# Patient Record
Sex: FEMALE | Race: WHITE | URBAN - METROPOLITAN AREA
[De-identification: names, ages, dates, MRNs, and addresses within clinical notes are randomized per-mention and may not be internally consistent; named-entity substitution may affect disease eponyms.]

---

## 2023-04-18 LAB
ABO, EXTERNAL RESULT: NORMAL
RH FACTOR, EXTERNAL RESULT: POSITIVE

## 2023-10-10 ENCOUNTER — ROUTINE PRENATAL (OUTPATIENT)
Age: 33
End: 2023-10-10
Payer: COMMERCIAL

## 2023-10-10 VITALS — HEIGHT: 69 IN

## 2023-10-10 PROCEDURE — 76811 OB US DETAILED SNGL FETUS: CPT | Performed by: OBSTETRICS & GYNECOLOGY

## 2023-10-10 PROCEDURE — 99203 OFFICE O/P NEW LOW 30 MIN: CPT | Performed by: OBSTETRICS & GYNECOLOGY

## 2023-10-10 RX ORDER — ONDANSETRON 4 MG/1
TABLET, FILM COATED ORAL
COMMUNITY

## 2023-10-10 RX ORDER — ASPIRIN 81 MG/1
TABLET ORAL
COMMUNITY

## 2023-10-10 NOTE — PROGRESS NOTES
MATERNAL FETAL MEDICINE CONSULTATION    DATE OF CONSULT: 10/10/23    REASON FOR CONSULTATION: IVF        HPI  Lina Carlton is a 27 y.o. female  at 20w0d who is referred for consultation due to an IVF pregnancy. Her prior pregnancy was notable for IVF and a  for failed induction. The source of infertility is unknown. She will be AMA at delivery. She has had preimplantation genetics which were normal.    There is no problem list on file for this patient. History reviewed. No pertinent past medical history. Past Surgical History:   Procedure Laterality Date     SECTION  2022    Failed induction       OB History    Para Term  AB Living   2 1 1     1   SAB IAB Ectopic Molar Multiple Live Births             1      # Outcome Date GA Lbr Baltazar/2nd Weight Sex Delivery Anes PTL Lv   2 Current            1 Term 22 39w6d  3.145 kg (6 lb 14.9 oz) F CS-LTranv EPI N HEATHER      Birth Comments: See delivery note.        Allergies   Allergen Reactions    Penicillins Hives         Current Outpatient Medications:     Prenatal Multivit-Min-Fe-FA (PRENATAL 1 + IRON PO), , Disp: , Rfl:     aspirin (ASPIRIN ADULT LOW DOSE) 81 MG EC tablet, , Disp: , Rfl:     ondansetron (ZOFRAN) 4 MG tablet, , Disp: , Rfl:     Social History     Tobacco Use    Smoking status: Never    Smokeless tobacco: Never   Vaping Use    Vaping Use: Never used   Substance Use Topics    Alcohol use: Not Currently    Drug use: Never       Family History   Problem Relation Age of Onset    Kidney Cancer Father     Diabetes type 2  Maternal Grandfather     Alzheimer's Disease Maternal Grandfather     Breast Cancer Paternal Cousin        Ht 1.753 m (5' 9\")      General: Comfortable, NAD  Constitution: Appears healthy    ULTRASOUND:  Ultrasound Result (most recent):  AMB MATERNAL FETAL MEDICINE ULTRASOUND (VIEWPOINT)     Narrative  PATIENTMelclem Carlton   -  : 1990   -  DOS:10/10/2023   -  INTERPRETING

## 2023-10-10 NOTE — PROCEDURES
PATIENT: Kenan Main   -  : 1990   -  DOS:10/10/2023   -  INTERPRETING PROVIDER:Kiran Vergara,   Indication  ========    IVF, AMA    Method  ======    Transabdominal ultrasound examination. View: Sufficient    Dating  ======    GA by prior assessment 21 w + 0 d  NICOLLE by prior assessment: 2024  Ultrasound examination on: 10/10/2023  GA by U/S based upon: [facility], BPD, Femur, HC  GA by U/S 21 w + 6 d  NICOLLE by U/S: 2024  Assigned: based on stated NICOLLE, selected on 10/10/2023  Assigned GA 21 w + 0 d  Assigned NICOLLE: 2024    Fetal Growth Overview  =================    Exam date        GA              BPD (mm)          HC (mm)              AC (mm)               FL (mm)             HL (mm)            EFW (g)  10/10/2023        21w 0d        52.1     79%        190.8    56%        172.3     80%        38.5    84%        35.3     83%        480    94%    Fetal Biometry  ============    Standard  BPD 52.1 mm 21w 6d 79% Hadlock  OFD 67.1 mm 22w 4d 92% Jose Antonio  .8 mm 21w 2d 56% Hadlock  Cerebellum tr 22.3 mm 20w 6d 58% Hill  Nuchal fold 2.7 mm  .3 mm 22w 1d 80% Hadlock  Femur 38.5 mm 22w 2d 84% Hadlock  Humerus 35.3 mm 22w 1d 83% Jose Antonio   g 22w 0d 94% Hadlock  EFW (lb) 1 lb  EFW (oz) 1 oz  EFW by: Hadlock (BPD-HC-AC-FL)  Extended   6.4 mm  CM 4.9 mm  39% Nicolaides  Nasal bone 8.3 mm  Head / Face / Neck  Nasal bone: present  Other Structures   bpm    General Evaluation  ==============    Cardiac activity present.  bpm. Fetal movements: visualized. Presentation: Cephalic  Placenta: Placental site: anterior, no evidence of low lying  Umbilical cord: Cord vessels: 3 vessel cord.  Insertion site: normal insertion  Amniotic fluid: Amount of AF: normal. MVP 5.9 cm    Fetal Anatomy  ===========    Cranium: normal  Lateral ventricles: normal  Choroid plexus: normal  Midline falx: normal  Cavum septi pellucidi: normal  Cerebellum: normal  Cisterna magna: normal  Head /

## 2024-01-01 ENCOUNTER — HOSPITAL ENCOUNTER (OUTPATIENT)
Facility: HOSPITAL | Age: 34
Discharge: HOME | End: 2024-01-01
Attending: STUDENT IN AN ORGANIZED HEALTH CARE EDUCATION/TRAINING PROGRAM | Admitting: STUDENT IN AN ORGANIZED HEALTH CARE EDUCATION/TRAINING PROGRAM
Payer: COMMERCIAL

## 2024-01-01 VITALS
TEMPERATURE: 97.9 F | SYSTOLIC BLOOD PRESSURE: 103 MMHG | OXYGEN SATURATION: 97 % | HEART RATE: 71 BPM | DIASTOLIC BLOOD PRESSURE: 62 MMHG | RESPIRATION RATE: 15 BRPM

## 2024-01-01 PROBLEM — O09.813 PREGNANCY RESULTING FROM IN VITRO FERTILIZATION IN THIRD TRIMESTER: Status: ACTIVE | Noted: 2021-09-14

## 2024-01-01 PROBLEM — O26.893 RIGHT UPPER QUADRANT ABDOMINAL PAIN AFFECTING PREGNANCY IN THIRD TRIMESTER: Status: ACTIVE | Noted: 2024-01-01

## 2024-01-01 PROBLEM — R10.11 RIGHT UPPER QUADRANT ABDOMINAL PAIN AFFECTING PREGNANCY IN THIRD TRIMESTER: Status: ACTIVE | Noted: 2024-01-01

## 2024-01-01 PROBLEM — Z3A.32 32 WEEKS GESTATION OF PREGNANCY: Status: ACTIVE | Noted: 2024-01-01

## 2024-01-01 LAB
ALBUMIN SERPL-MCNC: 2.3 G/DL (ref 3.5–5)
ALBUMIN/GLOB SERPL: 0.6 (ref 1.1–2.2)
ALP SERPL-CCNC: 139 U/L (ref 45–117)
ALT SERPL-CCNC: 19 U/L (ref 12–78)
ANION GAP SERPL CALC-SCNC: 6 MMOL/L (ref 5–15)
APPEARANCE UR: CLEAR
AST SERPL-CCNC: 16 U/L (ref 15–37)
BACTERIA URNS QL MICRO: ABNORMAL /HPF
BASOPHILS # BLD: 0 K/UL (ref 0–0.1)
BASOPHILS NFR BLD: 1 % (ref 0–1)
BILIRUB SERPL-MCNC: 0.3 MG/DL (ref 0.2–1)
BILIRUB UR QL: NEGATIVE
BUN SERPL-MCNC: 9 MG/DL (ref 6–20)
BUN/CREAT SERPL: 16 (ref 12–20)
CALCIUM SERPL-MCNC: 8.7 MG/DL (ref 8.5–10.1)
CHLORIDE SERPL-SCNC: 107 MMOL/L (ref 97–108)
CO2 SERPL-SCNC: 25 MMOL/L (ref 21–32)
COLOR UR: ABNORMAL
CREAT SERPL-MCNC: 0.56 MG/DL (ref 0.55–1.02)
CREAT UR-MCNC: 136 MG/DL
DIFFERENTIAL METHOD BLD: ABNORMAL
EOSINOPHIL # BLD: 0.1 K/UL (ref 0–0.4)
EOSINOPHIL NFR BLD: 1 % (ref 0–7)
EPITH CASTS URNS QL MICRO: ABNORMAL /LPF
ERYTHROCYTE [DISTWIDTH] IN BLOOD BY AUTOMATED COUNT: 12.4 % (ref 11.5–14.5)
GLOBULIN SER CALC-MCNC: 4.1 G/DL (ref 2–4)
GLUCOSE SERPL-MCNC: 97 MG/DL (ref 65–100)
GLUCOSE UR STRIP.AUTO-MCNC: NEGATIVE MG/DL
HCT VFR BLD AUTO: 32.2 % (ref 35–47)
HGB BLD-MCNC: 11.3 G/DL (ref 11.5–16)
HGB UR QL STRIP: NEGATIVE
IMM GRANULOCYTES # BLD AUTO: 0.1 K/UL (ref 0–0.04)
IMM GRANULOCYTES NFR BLD AUTO: 1 % (ref 0–0.5)
KETONES UR QL STRIP.AUTO: ABNORMAL MG/DL
LDH SERPL L TO P-CCNC: 156 U/L (ref 81–246)
LEUKOCYTE ESTERASE UR QL STRIP.AUTO: ABNORMAL
LYMPHOCYTES # BLD: 3.1 K/UL (ref 0.8–3.5)
LYMPHOCYTES NFR BLD: 35 % (ref 12–49)
MCH RBC QN AUTO: 31.9 PG (ref 26–34)
MCHC RBC AUTO-ENTMCNC: 35.1 G/DL (ref 30–36.5)
MCV RBC AUTO: 91 FL (ref 80–99)
MONOCYTES # BLD: 0.5 K/UL (ref 0–1)
MONOCYTES NFR BLD: 6 % (ref 5–13)
MUCOUS THREADS URNS QL MICRO: ABNORMAL /LPF
NEUTS SEG # BLD: 5 K/UL (ref 1.8–8)
NEUTS SEG NFR BLD: 56 % (ref 32–75)
NITRITE UR QL STRIP.AUTO: NEGATIVE
NRBC # BLD: 0 K/UL (ref 0–0.01)
NRBC BLD-RTO: 0 PER 100 WBC
PH UR STRIP: 7 (ref 5–8)
PLATELET # BLD AUTO: 299 K/UL (ref 150–400)
PMV BLD AUTO: 9.1 FL (ref 8.9–12.9)
POTASSIUM SERPL-SCNC: 4 MMOL/L (ref 3.5–5.1)
PROT SERPL-MCNC: 6.4 G/DL (ref 6.4–8.2)
PROT UR STRIP-MCNC: 30 MG/DL
PROT UR-MCNC: 47 MG/DL (ref 0–11.9)
PROT/CREAT UR-RTO: 0.3
RBC # BLD AUTO: 3.54 M/UL (ref 3.8–5.2)
RBC #/AREA URNS HPF: ABNORMAL /HPF (ref 0–5)
SODIUM SERPL-SCNC: 138 MMOL/L (ref 136–145)
SP GR UR REFRACTOMETRY: 1.02 (ref 1–1.03)
URINE CULTURE IF INDICATED: ABNORMAL
UROBILINOGEN UR QL STRIP.AUTO: 1 EU/DL (ref 0.2–1)
WBC # BLD AUTO: 8.8 K/UL (ref 3.6–11)
WBC URNS QL MICRO: ABNORMAL /HPF (ref 0–4)

## 2024-01-01 PROCEDURE — 81001 URINALYSIS AUTO W/SCOPE: CPT

## 2024-01-01 PROCEDURE — 82570 ASSAY OF URINE CREATININE: CPT

## 2024-01-01 PROCEDURE — 85025 COMPLETE CBC W/AUTO DIFF WBC: CPT

## 2024-01-01 PROCEDURE — 83615 LACTATE (LD) (LDH) ENZYME: CPT

## 2024-01-01 PROCEDURE — 99213 OFFICE O/P EST LOW 20 MIN: CPT

## 2024-01-01 PROCEDURE — 80053 COMPREHEN METABOLIC PANEL: CPT

## 2024-01-01 PROCEDURE — 36415 COLL VENOUS BLD VENIPUNCTURE: CPT

## 2024-01-01 PROCEDURE — 84156 ASSAY OF PROTEIN URINE: CPT

## 2024-01-01 PROCEDURE — 6370000000 HC RX 637 (ALT 250 FOR IP): Performed by: OBSTETRICS & GYNECOLOGY

## 2024-01-01 RX ORDER — ACETAMINOPHEN 325 MG/1
650 TABLET ORAL EVERY 4 HOURS PRN
Status: DISCONTINUED | OUTPATIENT
Start: 2024-01-01 | End: 2024-01-01 | Stop reason: HOSPADM

## 2024-01-01 RX ADMIN — ACETAMINOPHEN 650 MG: 325 TABLET ORAL at 19:10

## 2024-01-01 ASSESSMENT — ENCOUNTER SYMPTOMS
VOMITING: 0
DIARRHEA: 0
RHINORRHEA: 0
CHEST TIGHTNESS: 0
SHORTNESS OF BREATH: 0
TROUBLE SWALLOWING: 0
CONSTIPATION: 0
COUGH: 1
EYE PAIN: 0
BACK PAIN: 0
NAUSEA: 1

## 2024-01-01 ASSESSMENT — PAIN DESCRIPTION - LOCATION: LOCATION: ABDOMEN

## 2024-01-01 ASSESSMENT — PAIN SCALES - GENERAL: PAINLEVEL_OUTOF10: 4

## 2024-01-01 ASSESSMENT — PAIN DESCRIPTION - DESCRIPTORS: DESCRIPTORS: DULL

## 2024-01-01 ASSESSMENT — PAIN DESCRIPTION - ORIENTATION: ORIENTATION: UPPER;RIGHT

## 2024-01-01 ASSESSMENT — PAIN - FUNCTIONAL ASSESSMENT: PAIN_FUNCTIONAL_ASSESSMENT: ACTIVITIES ARE NOT PREVENTED

## 2024-01-01 NOTE — PROGRESS NOTES
1818 Pt arrived to unit for observation for RUQ that started yesterday. Pt states the pain feels dull, but sharp on occasion. Pt stated she has had a cough for a few weeks. Pt denies SOB, HA, N/V, LOF, ctx, bleeding, or visual disturbances. Pt admits to some mucous-like discharge. Pt stated this is an IVF pregnancy. She had a prior LTCS 20 months ago, and plans to have a repeat, unless going into spontaneous labor. Labs obtained, EFM applied.    1839 MD Stanford aware of pt arrival. MD to come to bedside.

## 2024-01-01 NOTE — H&P
L&D Triage    CC: right upper quadrant pain    Subjective:     Patient is a 34 yo  @ 32W6D, yuri 24 by IVF dating, h/o  x 1, pt of Dr Ortega, who presents with complaint of right upper quadrant pain. Patient states pain is chronic- ongoing for a few weeks. Baseline dull ache but became intermittently stabbing in nature since yesterday. Non radiating. Chronic nausea but no emesis. Chronic cough x 1.5 months. +FM. Denies LOF/VB/CTXs. Has no food intolerance. Denies fevers. Does have heartburn at times. Denies HTN.     History reviewed. No pertinent past medical history.    Past Surgical History:   Procedure Laterality Date     SECTION  2022    Failed induction    WISDOM TOOTH EXTRACTION         Family History   Problem Relation Age of Onset    Kidney Cancer Father     Diabetes type 2  Maternal Grandfather     Alzheimer's Disease Maternal Grandfather     Breast Cancer Paternal Cousin         Social History     Socioeconomic History    Marital status:      Spouse name: Not on file    Number of children: 1    Years of education: Not on file    Highest education level: Not on file   Occupational History    Not on file   Tobacco Use    Smoking status: Never    Smokeless tobacco: Never   Vaping Use    Vaping Use: Never used   Substance and Sexual Activity    Alcohol use: Not Currently    Drug use: Never    Sexual activity: Yes     Partners: Male     Comment: denies h/o STIs   Other Topics Concern    Not on file   Social History Narrative    Not on file     Social Determinants of Health     Financial Resource Strain: Not on file   Food Insecurity: Not on file   Transportation Needs: Not on file   Physical Activity: Not on file   Stress: Not on file   Social Connections: Not on file   Intimate Partner Violence: Not on file   Housing Stability: Not on file       Review of Systems  Review of Systems   Constitutional:  Negative for chills and fever.   HENT:  Negative for rhinorrhea and

## 2024-01-02 NOTE — PROGRESS NOTES
1900: SBAR report received from NELSON Coronado.    1907: VORB to remove patient from monitor from MD Stanford. Reactive NST. Repeat serial blood pressures while awaiting lab results.    1950: Patient provided with discharge instructions and education. Patient verbalizing understanding.    2000: Patient ambulatory off unit.

## 2024-01-24 LAB — GBS, EXTERNAL RESULT: POSITIVE

## 2024-02-07 ENCOUNTER — ANESTHESIA (OUTPATIENT)
Facility: HOSPITAL | Age: 34
End: 2024-02-07
Payer: COMMERCIAL

## 2024-02-07 ENCOUNTER — HOSPITAL ENCOUNTER (INPATIENT)
Facility: HOSPITAL | Age: 34
LOS: 2 days | Discharge: HOME | End: 2024-02-09
Attending: STUDENT IN AN ORGANIZED HEALTH CARE EDUCATION/TRAINING PROGRAM | Admitting: STUDENT IN AN ORGANIZED HEALTH CARE EDUCATION/TRAINING PROGRAM
Payer: COMMERCIAL

## 2024-02-07 ENCOUNTER — ANESTHESIA EVENT (OUTPATIENT)
Facility: HOSPITAL | Age: 34
End: 2024-02-07
Payer: COMMERCIAL

## 2024-02-07 DIAGNOSIS — Z98.891 S/P CESAREAN SECTION: Primary | ICD-10-CM

## 2024-02-07 LAB
ERYTHROCYTE [DISTWIDTH] IN BLOOD BY AUTOMATED COUNT: 12.9 % (ref 11.5–14.5)
HCT VFR BLD AUTO: 34.2 % (ref 35–47)
HGB BLD-MCNC: 11.5 G/DL (ref 11.5–16)
HISTORY CHECK: NORMAL
MCH RBC QN AUTO: 29 PG (ref 26–34)
MCHC RBC AUTO-ENTMCNC: 33.6 G/DL (ref 30–36.5)
MCV RBC AUTO: 86.4 FL (ref 80–99)
NRBC # BLD: 0 K/UL (ref 0–0.01)
NRBC BLD-RTO: 0 PER 100 WBC
PLATELET # BLD AUTO: 313 K/UL (ref 150–400)
PMV BLD AUTO: 9.1 FL (ref 8.9–12.9)
RBC # BLD AUTO: 3.96 M/UL (ref 3.8–5.2)
WBC # BLD AUTO: 14.1 K/UL (ref 3.6–11)

## 2024-02-07 PROCEDURE — 1100000000 HC RM PRIVATE

## 2024-02-07 PROCEDURE — 7100000001 HC PACU RECOVERY - ADDTL 15 MIN: Performed by: OBSTETRICS & GYNECOLOGY

## 2024-02-07 PROCEDURE — 1120000000 HC RM PRIVATE OB

## 2024-02-07 PROCEDURE — 51702 INSERT TEMP BLADDER CATH: CPT

## 2024-02-07 PROCEDURE — 86850 RBC ANTIBODY SCREEN: CPT

## 2024-02-07 PROCEDURE — 2500000003 HC RX 250 WO HCPCS: Performed by: NURSE ANESTHETIST, CERTIFIED REGISTERED

## 2024-02-07 PROCEDURE — 86900 BLOOD TYPING SEROLOGIC ABO: CPT

## 2024-02-07 PROCEDURE — 7100000000 HC PACU RECOVERY - FIRST 15 MIN: Performed by: OBSTETRICS & GYNECOLOGY

## 2024-02-07 PROCEDURE — 6360000002 HC RX W HCPCS: Performed by: OBSTETRICS & GYNECOLOGY

## 2024-02-07 PROCEDURE — 2580000003 HC RX 258: Performed by: STUDENT IN AN ORGANIZED HEALTH CARE EDUCATION/TRAINING PROGRAM

## 2024-02-07 PROCEDURE — 6360000002 HC RX W HCPCS: Performed by: NURSE ANESTHETIST, CERTIFIED REGISTERED

## 2024-02-07 PROCEDURE — 2709999900 HC NON-CHARGEABLE SUPPLY: Performed by: OBSTETRICS & GYNECOLOGY

## 2024-02-07 PROCEDURE — 36415 COLL VENOUS BLD VENIPUNCTURE: CPT

## 2024-02-07 PROCEDURE — 3609079900 HC CESAREAN SECTION: Performed by: OBSTETRICS & GYNECOLOGY

## 2024-02-07 PROCEDURE — 86923 COMPATIBILITY TEST ELECTRIC: CPT

## 2024-02-07 PROCEDURE — 85027 COMPLETE CBC AUTOMATED: CPT

## 2024-02-07 PROCEDURE — 3700000025 EPIDURAL BLOCK: Performed by: ANESTHESIOLOGY

## 2024-02-07 PROCEDURE — 86780 TREPONEMA PALLIDUM: CPT

## 2024-02-07 PROCEDURE — 6360000002 HC RX W HCPCS: Performed by: STUDENT IN AN ORGANIZED HEALTH CARE EDUCATION/TRAINING PROGRAM

## 2024-02-07 PROCEDURE — 2580000003 HC RX 258: Performed by: OBSTETRICS & GYNECOLOGY

## 2024-02-07 PROCEDURE — 86901 BLOOD TYPING SEROLOGIC RH(D): CPT

## 2024-02-07 RX ORDER — SODIUM CHLORIDE, SODIUM LACTATE, POTASSIUM CHLORIDE, CALCIUM CHLORIDE 600; 310; 30; 20 MG/100ML; MG/100ML; MG/100ML; MG/100ML
INJECTION, SOLUTION INTRAVENOUS CONTINUOUS
Status: DISCONTINUED | OUTPATIENT
Start: 2024-02-07 | End: 2024-02-08

## 2024-02-07 RX ORDER — SODIUM CHLORIDE, SODIUM LACTATE, POTASSIUM CHLORIDE, AND CALCIUM CHLORIDE .6; .31; .03; .02 G/100ML; G/100ML; G/100ML; G/100ML
1000 INJECTION, SOLUTION INTRAVENOUS PRN
Status: DISCONTINUED | OUTPATIENT
Start: 2024-02-07 | End: 2024-02-08

## 2024-02-07 RX ORDER — SODIUM CHLORIDE, SODIUM LACTATE, POTASSIUM CHLORIDE, AND CALCIUM CHLORIDE .6; .31; .03; .02 G/100ML; G/100ML; G/100ML; G/100ML
500 INJECTION, SOLUTION INTRAVENOUS PRN
Status: DISCONTINUED | OUTPATIENT
Start: 2024-02-07 | End: 2024-02-08

## 2024-02-07 RX ORDER — LIDOCAINE HYDROCHLORIDE AND EPINEPHRINE BITARTRATE 20; .01 MG/ML; MG/ML
INJECTION, SOLUTION SUBCUTANEOUS PRN
Status: DISCONTINUED | OUTPATIENT
Start: 2024-02-07 | End: 2024-02-07 | Stop reason: SDUPTHER

## 2024-02-07 RX ORDER — SODIUM CHLORIDE 9 MG/ML
INJECTION, SOLUTION INTRAVENOUS PRN
Status: DISCONTINUED | OUTPATIENT
Start: 2024-02-07 | End: 2024-02-08

## 2024-02-07 RX ORDER — METHYLERGONOVINE MALEATE 0.2 MG/ML
200 INJECTION INTRAVENOUS PRN
Status: DISCONTINUED | OUTPATIENT
Start: 2024-02-07 | End: 2024-02-08

## 2024-02-07 RX ORDER — SODIUM CHLORIDE 9 MG/ML
25 INJECTION, SOLUTION INTRAVENOUS PRN
Status: DISCONTINUED | OUTPATIENT
Start: 2024-02-07 | End: 2024-02-08

## 2024-02-07 RX ORDER — KETOROLAC TROMETHAMINE 30 MG/ML
INJECTION, SOLUTION INTRAMUSCULAR; INTRAVENOUS PRN
Status: DISCONTINUED | OUTPATIENT
Start: 2024-02-07 | End: 2024-02-07 | Stop reason: SDUPTHER

## 2024-02-07 RX ORDER — METHYLERGONOVINE MALEATE 0.2 MG/ML
INJECTION INTRAVENOUS PRN
Status: DISCONTINUED | OUTPATIENT
Start: 2024-02-07 | End: 2024-02-07 | Stop reason: ALTCHOICE

## 2024-02-07 RX ORDER — FENTANYL 0.2 MG/100ML-BUPIV 0.125%-NACL 0.9% EPIDURAL INJ 2/0.125 MCG/ML-%
12 SOLUTION INJECTION CONTINUOUS
Status: DISCONTINUED | OUTPATIENT
Start: 2024-02-07 | End: 2024-02-08

## 2024-02-07 RX ORDER — DOCUSATE SODIUM 100 MG/1
100 CAPSULE, LIQUID FILLED ORAL 2 TIMES DAILY
Status: DISCONTINUED | OUTPATIENT
Start: 2024-02-07 | End: 2024-02-08

## 2024-02-07 RX ORDER — BUPIVACAINE HYDROCHLORIDE 2.5 MG/ML
INJECTION, SOLUTION EPIDURAL; INFILTRATION; INTRACAUDAL PRN
Status: DISCONTINUED | OUTPATIENT
Start: 2024-02-07 | End: 2024-02-07

## 2024-02-07 RX ORDER — ONDANSETRON 2 MG/ML
INJECTION INTRAMUSCULAR; INTRAVENOUS PRN
Status: DISCONTINUED | OUTPATIENT
Start: 2024-02-07 | End: 2024-02-07 | Stop reason: SDUPTHER

## 2024-02-07 RX ORDER — MISOPROSTOL 200 UG/1
800 TABLET ORAL PRN
Status: DISCONTINUED | OUTPATIENT
Start: 2024-02-07 | End: 2024-02-08

## 2024-02-07 RX ORDER — OXYTOCIN 10 [USP'U]/ML
INJECTION, SOLUTION INTRAMUSCULAR; INTRAVENOUS PRN
Status: DISCONTINUED | OUTPATIENT
Start: 2024-02-07 | End: 2024-02-07 | Stop reason: SDUPTHER

## 2024-02-07 RX ORDER — BUPIVACAINE HYDROCHLORIDE 2.5 MG/ML
INJECTION, SOLUTION EPIDURAL; INFILTRATION; INTRACAUDAL PRN
Status: DISCONTINUED | OUTPATIENT
Start: 2024-02-07 | End: 2024-02-07 | Stop reason: SDUPTHER

## 2024-02-07 RX ORDER — SODIUM CHLORIDE 0.9 % (FLUSH) 0.9 %
5-40 SYRINGE (ML) INJECTION PRN
Status: DISCONTINUED | OUTPATIENT
Start: 2024-02-07 | End: 2024-02-08

## 2024-02-07 RX ORDER — NALOXONE HYDROCHLORIDE 0.4 MG/ML
INJECTION, SOLUTION INTRAMUSCULAR; INTRAVENOUS; SUBCUTANEOUS PRN
Status: DISCONTINUED | OUTPATIENT
Start: 2024-02-07 | End: 2024-02-08

## 2024-02-07 RX ORDER — CLINDAMYCIN PHOSPHATE 900 MG/50ML
900 INJECTION, SOLUTION INTRAVENOUS EVERY 8 HOURS
Status: DISCONTINUED | OUTPATIENT
Start: 2024-02-07 | End: 2024-02-08

## 2024-02-07 RX ORDER — ONDANSETRON 2 MG/ML
4 INJECTION INTRAMUSCULAR; INTRAVENOUS EVERY 6 HOURS PRN
Status: DISCONTINUED | OUTPATIENT
Start: 2024-02-07 | End: 2024-02-08

## 2024-02-07 RX ORDER — TRANEXAMIC ACID 10 MG/ML
1000 INJECTION, SOLUTION INTRAVENOUS
Status: DISCONTINUED | OUTPATIENT
Start: 2024-02-07 | End: 2024-02-08

## 2024-02-07 RX ORDER — SODIUM CHLORIDE 0.9 % (FLUSH) 0.9 %
5-40 SYRINGE (ML) INJECTION EVERY 12 HOURS SCHEDULED
Status: DISCONTINUED | OUTPATIENT
Start: 2024-02-07 | End: 2024-02-08

## 2024-02-07 RX ORDER — CARBOPROST TROMETHAMINE 250 UG/ML
250 INJECTION, SOLUTION INTRAMUSCULAR PRN
Status: DISCONTINUED | OUTPATIENT
Start: 2024-02-07 | End: 2024-02-08

## 2024-02-07 RX ORDER — PHENYLEPHRINE HCL IN 0.9% NACL 0.4MG/10ML
SYRINGE (ML) INTRAVENOUS PRN
Status: DISCONTINUED | OUTPATIENT
Start: 2024-02-07 | End: 2024-02-07 | Stop reason: SDUPTHER

## 2024-02-07 RX ORDER — MORPHINE SULFATE 1 MG/ML
INJECTION, SOLUTION EPIDURAL; INTRATHECAL; INTRAVENOUS PRN
Status: DISCONTINUED | OUTPATIENT
Start: 2024-02-07 | End: 2024-02-07 | Stop reason: SDUPTHER

## 2024-02-07 RX ORDER — DEXAMETHASONE SODIUM PHOSPHATE 4 MG/ML
INJECTION, SOLUTION INTRA-ARTICULAR; INTRALESIONAL; INTRAMUSCULAR; INTRAVENOUS; SOFT TISSUE PRN
Status: DISCONTINUED | OUTPATIENT
Start: 2024-02-07 | End: 2024-02-07 | Stop reason: SDUPTHER

## 2024-02-07 RX ADMIN — LIDOCAINE HYDROCHLORIDE,EPINEPHRINE BITARTRATE 10 ML: 20; .01 INJECTION, SOLUTION INFILTRATION; PERINEURAL at 21:22

## 2024-02-07 RX ADMIN — LIDOCAINE HYDROCHLORIDE,EPINEPHRINE BITARTRATE 5 ML: 20; .01 INJECTION, SOLUTION INFILTRATION; PERINEURAL at 21:33

## 2024-02-07 RX ADMIN — Medication 12 ML/HR: at 20:04

## 2024-02-07 RX ADMIN — GENTAMICIN SULFATE 352 MG: 40 INJECTION, SOLUTION INTRAMUSCULAR; INTRAVENOUS at 21:38

## 2024-02-07 RX ADMIN — OXYTOCIN 40 ML: 10 INJECTION, SOLUTION INTRAMUSCULAR; INTRAVENOUS at 21:50

## 2024-02-07 RX ADMIN — SODIUM CHLORIDE, POTASSIUM CHLORIDE, SODIUM LACTATE AND CALCIUM CHLORIDE 1000 ML: 600; 310; 30; 20 INJECTION, SOLUTION INTRAVENOUS at 11:34

## 2024-02-07 RX ADMIN — KETOROLAC TROMETHAMINE 30 MG: 30 INJECTION, SOLUTION INTRAMUSCULAR; INTRAVENOUS at 22:36

## 2024-02-07 RX ADMIN — BUPIVACAINE HYDROCHLORIDE 10 ML: 2.5 INJECTION, SOLUTION EPIDURAL; INFILTRATION; INTRACAUDAL; PERINEURAL at 12:45

## 2024-02-07 RX ADMIN — ONDANSETRON 4 MG: 2 INJECTION INTRAMUSCULAR; INTRAVENOUS at 21:52

## 2024-02-07 RX ADMIN — Medication 120 MCG: at 21:36

## 2024-02-07 RX ADMIN — CLINDAMYCIN PHOSPHATE 900 MG: 900 INJECTION, SOLUTION INTRAVENOUS at 20:51

## 2024-02-07 RX ADMIN — SODIUM CHLORIDE, POTASSIUM CHLORIDE, SODIUM LACTATE AND CALCIUM CHLORIDE 1000 ML: 600; 310; 30; 20 INJECTION, SOLUTION INTRAVENOUS at 20:56

## 2024-02-07 RX ADMIN — MORPHINE SULFATE 3 MG: 1 INJECTION, SOLUTION EPIDURAL; INTRATHECAL; INTRAVENOUS at 21:57

## 2024-02-07 RX ADMIN — Medication 120 MCG: at 21:44

## 2024-02-07 RX ADMIN — SODIUM CHLORIDE, POTASSIUM CHLORIDE, SODIUM LACTATE AND CALCIUM CHLORIDE: 600; 310; 30; 20 INJECTION, SOLUTION INTRAVENOUS at 13:59

## 2024-02-07 RX ADMIN — Medication 12 ML/HR: at 13:24

## 2024-02-07 RX ADMIN — SODIUM CHLORIDE, POTASSIUM CHLORIDE, SODIUM LACTATE AND CALCIUM CHLORIDE: 600; 310; 30; 20 INJECTION, SOLUTION INTRAVENOUS at 12:34

## 2024-02-07 RX ADMIN — Medication 120 MCG: at 21:39

## 2024-02-07 RX ADMIN — SODIUM CHLORIDE, POTASSIUM CHLORIDE, SODIUM LACTATE AND CALCIUM CHLORIDE: 600; 310; 30; 20 INJECTION, SOLUTION INTRAVENOUS at 21:24

## 2024-02-07 RX ADMIN — LIDOCAINE HYDROCHLORIDE,EPINEPHRINE BITARTRATE 3 ML: 20; .01 INJECTION, SOLUTION INFILTRATION; PERINEURAL at 12:39

## 2024-02-07 RX ADMIN — CLINDAMYCIN PHOSPHATE 900 MG: 900 INJECTION, SOLUTION INTRAVENOUS at 12:20

## 2024-02-07 RX ADMIN — DEXAMETHASONE SODIUM PHOSPHATE 4 MG: 4 INJECTION INTRA-ARTICULAR; INTRALESIONAL; INTRAMUSCULAR; INTRAVENOUS; SOFT TISSUE at 21:52

## 2024-02-07 NOTE — ANESTHESIA PROCEDURE NOTES
Epidural Block    Patient location during procedure: OB  Start time: 2/7/2024 12:25 PM  End time: 2/7/2024 12:50 PM  Reason for block: labor epidural  Staffing  Resident/CRNA: Juan Talbert APRN - CRNA  Performed by: Juan Talbert APRN - CRNA  Authorized by: Francisco J Lovell DO    Epidural  Patient position: sitting  Prep: ChloraPrep  Patient monitoring: continuous pulse ox and frequent blood pressure checks  Approach: midline  Location: L4-5  Injection technique: GUERRERO saline  Provider prep: mask, sterile gloves and sterile gown  Needle  Needle type: Tuohy   Needle gauge: 17 G  Needle length: 3.5 in  Needle insertion depth: 7 cm  Catheter type: end hole  Catheter size: 20 G  Catheter at skin depth: 13 cm  Test dose: negativeCatheter Secured: tegaderm and tape  Assessment  Sensory level: T6  Hemodynamics: stable  Attempts: 1  Outcomes: patient tolerated procedure well and uncomplicated  Preanesthetic Checklist  Completed: patient identified, IV checked, site marked, risks and benefits discussed, surgical/procedural consents, equipment checked, pre-op evaluation, timeout performed, anesthesia consent given, oxygen available, monitors applied/VS acknowledged, fire risk safety assessment completed and verbalized and blood product R/B/A discussed and consented

## 2024-02-07 NOTE — PROGRESS NOTES
1055: Patient arrived to labor and delivery room 205 with c/o contractions. RN at the bedside to discuss pan of care. Patient stated she desires for epidural.    1225: RN at the bedside with patient for placement of epidural per SARAH Martinez.     1229: Time out for epidural placement.    1239: Test dose at this time. Patient tolerated well.     1415:  at the bedside. SVE per MD. Stated patient is 4/50/-2. AROM moderate bloody amount.     1900: Bedside and Verbal shift change report given to Susie DAMON (oncoming nurse) by Stephan DAMON (offgoing nurse). Report included the following information Nurse Handoff Report, Index, Adult Overview, Surgery Report, Intake/Output, MAR, and Recent Results.

## 2024-02-07 NOTE — ANESTHESIA PRE PROCEDURE
Department of Anesthesiology  Preprocedure Note       Name:  Riri Whitlock   Age:  33 y.o.  :  1990                                          MRN:  [id number]         Date:  2024      Surgeon: * No surgeons listed *    Procedure: * No procedures listed *    Medications prior to admission:   Prior to Admission medications    Medication Sig Start Date End Date Taking? Authorizing Provider   Prenatal Multivit-Min-Fe-FA (PRENATAL 1 + IRON PO)     Jennifer Taveras MD   aspirin (ASPIRIN ADULT LOW DOSE) 81 MG EC tablet     Jennifer Taveras MD   ondansetron (ZOFRAN) 4 MG tablet     Jennifer Taveras MD       Current medications:    Current Facility-Administered Medications   Medication Dose Route Frequency Provider Last Rate Last Admin   • lactated ringers IV soln infusion   IntraVENous Continuous Lia Ortega MD       • lactated ringers bolus bolus 500 mL  500 mL IntraVENous PRN Lia Ortega MD        Or   • lactated ringers bolus bolus 1,000 mL  1,000 mL IntraVENous PRN Lia Ortega  mL/hr at 24 1134 1,000 mL at 24 1134   • sodium chloride flush 0.9 % injection 5-40 mL  5-40 mL IntraVENous 2 times per day Lia Ortega MD       • sodium chloride flush 0.9 % injection 5-40 mL  5-40 mL IntraVENous PRN Lia Ortega MD       • 0.9 % sodium chloride infusion  25 mL IntraVENous PRN Lia Ortega MD       • methylergonovine (METHERGINE) injection 200 mcg  200 mcg IntraMUSCular PRN Lia Ortega MD       • carboprost (HEMABATE) injection 250 mcg  250 mcg IntraMUSCular PRN Lia Ortega MD       • miSOPROStol (CYTOTEC) tablet 800 mcg  800 mcg Rectal PRN Lia Ortega MD       • tranexamic acid-NaCl IVPB premix 1,000 mg  1,000 mg IntraVENous Once PRN Lia Ortega MD       • oxytocin (PITOCIN) 30 units in 500 mL infusion  87.3 maria [place] carmen-units/min IntraVENous Continuous PRN Lia Ortega MD        And   • oxytocin (PITOCIN) 10 unit bolus from the

## 2024-02-07 NOTE — H&P
History & Physical    Name: Riri Whitlock MRN: [id number]  SSN: [id number]    YOB: 1990  Age: 33 y.o.  Sex: female        Subjective:     Estimated Date of Delivery: 24    Ms. Whitlock is a  at 38w1d presenting to L&D for labor. Denies LOF, VB. +GFM.  Contractions started around 1AM this morning, getting progressively more intense    Pregnancy complicated by:  History of C/S x1 (2022) for failed induction of labor. Plan for TOLAC if spontaneous labor  IVF pregnancy: ldASA, normal fetal echo, EFW at 36wks 94%tile, AC 96%tile  AMA  Elevated 1hr, CBG x2wks normal    OB History          3    Para   1    Term   1            AB   1    Living   1         SAB        IAB        Ectopic        Molar        Multiple        Live Births   1              No past medical history on file.  Past Surgical History:   Procedure Laterality Date     SECTION  2022    Failed induction    WISDOM TOOTH EXTRACTION       Social History     Occupational History    Not on file   Tobacco Use    Smoking status: Never    Smokeless tobacco: Never   Vaping Use    Vaping Use: Never used   Substance and Sexual Activity    Alcohol use: Not Currently    Drug use: Never    Sexual activity: Yes     Partners: Male     Comment: denies h/o STIs     Family History   Problem Relation Age of Onset    Kidney Cancer Father     Diabetes type 2  Maternal Grandfather     Alzheimer's Disease Maternal Grandfather     Breast Cancer Paternal Cousin        Allergies   Allergen Reactions    Penicillins Hives     Prior to Admission medications    Medication Sig Start Date End Date Taking? Authorizing Provider   Prenatal Multivit-Min-Fe-FA (PRENATAL 1 + IRON PO)     Jennifer Taveras MD   aspirin (ASPIRIN ADULT LOW DOSE) 81 MG EC tablet     Jennifer Taveras MD   ondansetron (ZOFRAN) 4 MG tablet     Jennifer Taveras MD        Review of Systems: Pertinent items are noted in HPI.    Objective:      Vitals:  Vitals:    24 1255 24 1258 24 1301 24 1304   BP: 96/60 98/64 106/69 105/69   Pulse: 83 90 90 78   Resp:       Temp:   97.8 °F (36.6 °C)    TempSrc:   Oral    SpO2:  99%     Weight:       Height:            Physical Exam:    EFW 7lb#  Membranes:  Intact  Fetal Heart Rate: Reactive  Baseline: 140 per minute  Variability: moderate  Accelerations: yes  Decelerations: none  Uterine contractions: irregular, every 2-5 minutes    SVE: 450/-2, AROM bloody fluid    Prenatal Labs:   No components found for: \"OBEXTABORH\", \"OBEXTABSCRN\", \"OBEXTRUBELLA\", \"OBEXTGRBS\", \"OBEXTHBSAG\", \"OBEXTHIV\", \"OBEXTRPR\", \"OBEXTGONORR\", \"OBEXTCHLAM\"     Assessment/Plan:     Plan: 33 y.o.  at 38w1d, admit for labor, TOLAC  - Rh pos / RI/GBS POS   - Diet CLD  - Fetal well being: Cat 1   - Anesthesia PCEA in place, functioning well  - Labor plan: AROM for blood fluid at 14:30, continue expectant management  - GBS pos- PCN allergy. Clindamycin sensitive. S/p first dose    - TOLAC consent: We discussed risks and benefits associated with TOLAC. Reviewed approx 1% risk of uterine rupture requiring emergent  delivery with rare potential morbidity or mortality for infant or patient . Also discussed potential for  section in the context of failed TOLAC, suspected uterine rupture, or other indications. Discussed risks of  to include bleeding, need for transfusion, infection, damage to surrounding structures (tubes, ovaries, bladder, bowel, nerves, blood vessels, etc), VTE, anesthesia risks, and need for further surgeries including  hysterectomy in rare cases. Patient also consented for blood product transfusion. Risks/benefits reviewed. Patient expressed understanding of risks and desire to proceed with TOLAC with acceptance of  delivery if indicated. Consents signed and witnessed. All questions answered.    Lia Ortega MD  [facility]        Signed By:

## 2024-02-08 LAB
ALBUMIN SERPL-MCNC: 1.6 G/DL (ref 3.5–5)
ALBUMIN/GLOB SERPL: 0.4 (ref 1.1–2.2)
ALP SERPL-CCNC: 155 U/L (ref 45–117)
ALT SERPL-CCNC: 11 U/L (ref 12–78)
ANION GAP SERPL CALC-SCNC: 4 MMOL/L (ref 5–15)
AST SERPL-CCNC: 15 U/L (ref 15–37)
BASOPHILS # BLD: 0.1 K/UL (ref 0–0.1)
BASOPHILS NFR BLD: 0 % (ref 0–1)
BILIRUB SERPL-MCNC: 0.5 MG/DL (ref 0.2–1)
BUN SERPL-MCNC: 10 MG/DL (ref 6–20)
BUN/CREAT SERPL: 13 (ref 12–20)
CALCIUM SERPL-MCNC: 7.9 MG/DL (ref 8.5–10.1)
CHLORIDE SERPL-SCNC: 108 MMOL/L (ref 97–108)
CO2 SERPL-SCNC: 23 MMOL/L (ref 21–32)
CREAT SERPL-MCNC: 0.8 MG/DL (ref 0.55–1.02)
DIFFERENTIAL METHOD BLD: ABNORMAL
EOSINOPHIL # BLD: 0.1 K/UL (ref 0–0.4)
EOSINOPHIL NFR BLD: 1 % (ref 0–7)
ERYTHROCYTE [DISTWIDTH] IN BLOOD BY AUTOMATED COUNT: 13.1 % (ref 11.5–14.5)
ERYTHROCYTE [DISTWIDTH] IN BLOOD BY AUTOMATED COUNT: 13.4 % (ref 11.5–14.5)
GLOBULIN SER CALC-MCNC: 3.8 G/DL (ref 2–4)
GLUCOSE SERPL-MCNC: 103 MG/DL (ref 65–100)
HCT VFR BLD AUTO: 22.6 % (ref 35–47)
HCT VFR BLD AUTO: 27.7 % (ref 35–47)
HGB BLD-MCNC: 7.5 G/DL (ref 11.5–16)
HGB BLD-MCNC: 9.6 G/DL (ref 11.5–16)
IMM GRANULOCYTES # BLD AUTO: 0.2 K/UL (ref 0–0.04)
IMM GRANULOCYTES NFR BLD AUTO: 2 % (ref 0–0.5)
LYMPHOCYTES # BLD: 3.8 K/UL (ref 0.8–3.5)
LYMPHOCYTES NFR BLD: 25 % (ref 12–49)
MCH RBC QN AUTO: 28.5 PG (ref 26–34)
MCH RBC QN AUTO: 30.1 PG (ref 26–34)
MCHC RBC AUTO-ENTMCNC: 33.2 G/DL (ref 30–36.5)
MCHC RBC AUTO-ENTMCNC: 34.7 G/DL (ref 30–36.5)
MCV RBC AUTO: 85.9 FL (ref 80–99)
MCV RBC AUTO: 86.8 FL (ref 80–99)
MONOCYTES # BLD: 0.9 K/UL (ref 0–1)
MONOCYTES NFR BLD: 6 % (ref 5–13)
NEUTS SEG # BLD: 10.3 K/UL (ref 1.8–8)
NEUTS SEG NFR BLD: 66 % (ref 32–75)
NRBC # BLD: 0 K/UL (ref 0–0.01)
NRBC # BLD: 0 K/UL (ref 0–0.01)
NRBC BLD-RTO: 0 PER 100 WBC
NRBC BLD-RTO: 0 PER 100 WBC
PLATELET # BLD AUTO: 281 K/UL (ref 150–400)
PLATELET # BLD AUTO: 281 K/UL (ref 150–400)
PMV BLD AUTO: 8.9 FL (ref 8.9–12.9)
PMV BLD AUTO: 9.2 FL (ref 8.9–12.9)
POTASSIUM SERPL-SCNC: 4.4 MMOL/L (ref 3.5–5.1)
PROT SERPL-MCNC: 5.4 G/DL (ref 6.4–8.2)
RBC # BLD AUTO: 2.63 M/UL (ref 3.8–5.2)
RBC # BLD AUTO: 3.19 M/UL (ref 3.8–5.2)
SODIUM SERPL-SCNC: 135 MMOL/L (ref 136–145)
T PALLIDUM AB SER QL IA: NON REACTIVE
WBC # BLD AUTO: 15.1 K/UL (ref 3.6–11)
WBC # BLD AUTO: 15.4 K/UL (ref 3.6–11)

## 2024-02-08 PROCEDURE — 94761 N-INVAS EAR/PLS OXIMETRY MLT: CPT

## 2024-02-08 PROCEDURE — 6360000002 HC RX W HCPCS: Performed by: OBSTETRICS & GYNECOLOGY

## 2024-02-08 PROCEDURE — 2580000003 HC RX 258: Performed by: OBSTETRICS & GYNECOLOGY

## 2024-02-08 PROCEDURE — 1120000000 HC RM PRIVATE OB

## 2024-02-08 PROCEDURE — 04QF0ZZ REPAIR LEFT INTERNAL ILIAC ARTERY, OPEN APPROACH: ICD-10-PCS | Performed by: OBSTETRICS & GYNECOLOGY

## 2024-02-08 PROCEDURE — 30233N1 TRANSFUSION OF NONAUTOLOGOUS RED BLOOD CELLS INTO PERIPHERAL VEIN, PERCUTANEOUS APPROACH: ICD-10-PCS | Performed by: STUDENT IN AN ORGANIZED HEALTH CARE EDUCATION/TRAINING PROGRAM

## 2024-02-08 PROCEDURE — 85025 COMPLETE CBC W/AUTO DIFF WBC: CPT

## 2024-02-08 PROCEDURE — 36415 COLL VENOUS BLD VENIPUNCTURE: CPT

## 2024-02-08 PROCEDURE — 10D00Z2 EXTRACTION OF PRODUCTS OF CONCEPTION, EXTRAPERITONEAL, OPEN APPROACH: ICD-10-PCS | Performed by: OBSTETRICS & GYNECOLOGY

## 2024-02-08 PROCEDURE — 85027 COMPLETE CBC AUTOMATED: CPT

## 2024-02-08 PROCEDURE — 36430 TRANSFUSION BLD/BLD COMPNT: CPT

## 2024-02-08 PROCEDURE — 80053 COMPREHEN METABOLIC PANEL: CPT

## 2024-02-08 PROCEDURE — P9016 RBC LEUKOCYTES REDUCED: HCPCS

## 2024-02-08 PROCEDURE — 6370000000 HC RX 637 (ALT 250 FOR IP): Performed by: OBSTETRICS & GYNECOLOGY

## 2024-02-08 RX ORDER — CARBOPROST TROMETHAMINE 250 UG/ML
250 INJECTION, SOLUTION INTRAMUSCULAR PRN
Status: DISCONTINUED | OUTPATIENT
Start: 2024-02-08 | End: 2024-02-09 | Stop reason: HOSPADM

## 2024-02-08 RX ORDER — SODIUM CHLORIDE 9 MG/ML
INJECTION, SOLUTION INTRAVENOUS PRN
Status: DISCONTINUED | OUTPATIENT
Start: 2024-02-08 | End: 2024-02-09 | Stop reason: HOSPADM

## 2024-02-08 RX ORDER — ACETAMINOPHEN 500 MG
1000 TABLET ORAL EVERY 8 HOURS SCHEDULED
Status: DISCONTINUED | OUTPATIENT
Start: 2024-02-08 | End: 2024-02-09 | Stop reason: HOSPADM

## 2024-02-08 RX ORDER — SODIUM CHLORIDE 0.9 % (FLUSH) 0.9 %
5-40 SYRINGE (ML) INJECTION EVERY 12 HOURS SCHEDULED
Status: DISCONTINUED | OUTPATIENT
Start: 2024-02-08 | End: 2024-02-09 | Stop reason: HOSPADM

## 2024-02-08 RX ORDER — SODIUM CHLORIDE, SODIUM LACTATE, POTASSIUM CHLORIDE, AND CALCIUM CHLORIDE .6; .31; .03; .02 G/100ML; G/100ML; G/100ML; G/100ML
1000 INJECTION, SOLUTION INTRAVENOUS ONCE
Status: COMPLETED | OUTPATIENT
Start: 2024-02-08 | End: 2024-02-08

## 2024-02-08 RX ORDER — LANOLIN/MINERAL OIL
LOTION (ML) TOPICAL
Status: DISCONTINUED | OUTPATIENT
Start: 2024-02-08 | End: 2024-02-09 | Stop reason: HOSPADM

## 2024-02-08 RX ORDER — ONDANSETRON 2 MG/ML
4 INJECTION INTRAMUSCULAR; INTRAVENOUS EVERY 6 HOURS PRN
Status: DISCONTINUED | OUTPATIENT
Start: 2024-02-08 | End: 2024-02-09 | Stop reason: HOSPADM

## 2024-02-08 RX ORDER — METHYLERGONOVINE MALEATE 0.2 MG/ML
200 INJECTION INTRAVENOUS PRN
Status: DISCONTINUED | OUTPATIENT
Start: 2024-02-08 | End: 2024-02-09 | Stop reason: HOSPADM

## 2024-02-08 RX ORDER — TRANEXAMIC ACID 10 MG/ML
1000 INJECTION, SOLUTION INTRAVENOUS
Status: ACTIVE | OUTPATIENT
Start: 2024-02-08 | End: 2024-02-09

## 2024-02-08 RX ORDER — OXYCODONE HYDROCHLORIDE 5 MG/1
10 TABLET ORAL EVERY 4 HOURS PRN
Status: DISCONTINUED | OUTPATIENT
Start: 2024-02-08 | End: 2024-02-09 | Stop reason: HOSPADM

## 2024-02-08 RX ORDER — DOCUSATE SODIUM 100 MG/1
100 CAPSULE, LIQUID FILLED ORAL 2 TIMES DAILY
Status: DISCONTINUED | OUTPATIENT
Start: 2024-02-08 | End: 2024-02-09 | Stop reason: HOSPADM

## 2024-02-08 RX ORDER — OXYCODONE HYDROCHLORIDE 5 MG/1
5 TABLET ORAL EVERY 4 HOURS PRN
Status: DISCONTINUED | OUTPATIENT
Start: 2024-02-08 | End: 2024-02-09 | Stop reason: HOSPADM

## 2024-02-08 RX ORDER — ONDANSETRON 4 MG/1
4 TABLET, ORALLY DISINTEGRATING ORAL EVERY 8 HOURS PRN
Status: DISCONTINUED | OUTPATIENT
Start: 2024-02-08 | End: 2024-02-09 | Stop reason: HOSPADM

## 2024-02-08 RX ORDER — KETOROLAC TROMETHAMINE 30 MG/ML
30 INJECTION, SOLUTION INTRAMUSCULAR; INTRAVENOUS EVERY 6 HOURS
Status: DISPENSED | OUTPATIENT
Start: 2024-02-08 | End: 2024-02-09

## 2024-02-08 RX ORDER — SODIUM CHLORIDE 0.9 % (FLUSH) 0.9 %
5-40 SYRINGE (ML) INJECTION PRN
Status: DISCONTINUED | OUTPATIENT
Start: 2024-02-08 | End: 2024-02-09 | Stop reason: HOSPADM

## 2024-02-08 RX ADMIN — ONDANSETRON 4 MG: 2 INJECTION INTRAMUSCULAR; INTRAVENOUS at 03:29

## 2024-02-08 RX ADMIN — OXYCODONE 10 MG: 5 TABLET ORAL at 02:59

## 2024-02-08 RX ADMIN — ACETAMINOPHEN 1000 MG: 500 TABLET ORAL at 12:36

## 2024-02-08 RX ADMIN — ACETAMINOPHEN 1000 MG: 500 TABLET ORAL at 20:48

## 2024-02-08 RX ADMIN — DOCUSATE SODIUM 100 MG: 100 CAPSULE, LIQUID FILLED ORAL at 09:03

## 2024-02-08 RX ADMIN — SODIUM CHLORIDE, PRESERVATIVE FREE 10 ML: 5 INJECTION INTRAVENOUS at 09:09

## 2024-02-08 RX ADMIN — SODIUM CHLORIDE, POTASSIUM CHLORIDE, SODIUM LACTATE AND CALCIUM CHLORIDE 1000 ML: 600; 310; 30; 20 INJECTION, SOLUTION INTRAVENOUS at 06:57

## 2024-02-08 RX ADMIN — KETOROLAC TROMETHAMINE 30 MG: 30 INJECTION, SOLUTION INTRAMUSCULAR at 17:15

## 2024-02-08 RX ADMIN — KETOROLAC TROMETHAMINE 30 MG: 30 INJECTION, SOLUTION INTRAMUSCULAR at 09:03

## 2024-02-08 ASSESSMENT — PAIN SCALES - GENERAL
PAINLEVEL_OUTOF10: 8
PAINLEVEL_OUTOF10: 3

## 2024-02-08 ASSESSMENT — PAIN DESCRIPTION - DESCRIPTORS
DESCRIPTORS: ACHING;SORE
DESCRIPTORS: ACHING;SORE

## 2024-02-08 ASSESSMENT — PAIN DESCRIPTION - LOCATION
LOCATION: ABDOMEN
LOCATION: ABDOMEN

## 2024-02-08 ASSESSMENT — PAIN DESCRIPTION - ORIENTATION
ORIENTATION: ANTERIOR;LOWER
ORIENTATION: ANTERIOR;LOWER

## 2024-02-08 NOTE — OP NOTE
[facility]  OPERATIVE REPORT    Name:  CHACORTA THORNE  MR#:  [id number]  :  1990  ACCOUNT #:  [id number]  DATE OF SERVICE:  2024    PREOPERATIVE DIAGNOSES:  1.  Prior , failed attempt at vaginal birth   2.  Cervix with slow change, only 5 cm.  3.  Bloody amniotic fluid, suspicious for placental abruption and prior .  4.  Pregnancy, 38 weeks.    POSTOPERATIVE DIAGNOSES:  1.  Prior , failed attempt at vaginal birth  2.  Cervix with slow change, only 5 cm.  3.  Bloody amniotic fluid, suspicious for placental abruption and prior .  4.  Pregnancy, 38 weeks.  5.  Live female infant.    PROCEDURE PERFORMED:  Repeat  delivery through a left paravesical extraperitoneal  with a low transverse uterine segment incision.    SURGEON:  Rodney Herron MD    ASSISTANT:  None.    ANESTHESIA:  Epidural.    BIRTH:  4140 g and 9 pound 2 ounce live female infant who had good Apgars and is currently doing well.    COMPLICATIONS:  Laceration of the left uterine artery.    SPECIMENS REMOVED:  Placenta and membranes were sent to Pathology.    DRAIN:  Lowery catheter to straight drainage.  Urine had blood in it, but also had blood in it prior to the surgery.    IMPLANTS:  None.    ESTIMATED BLOOD LOSS:  2200 mL.    PROCEDURE:  We went back to the operating room.  She was placed on the table in supine position.  She was prepped and draped in the usual sterile manner.  Epidural catheter was dosed.    I made a low transverse skin incision there in the previous scar.  I incised subcutaneous layer, anterior fascia sheath, and medial half of the rectus abdominis muscles transversely.    I opened up the space of Retzius and identified the left obliterated umbilical artery and swept the bladder down to the right off the lower uterine segment.    I identified the lower uterine segment, incised it with a scalpel; then, completed the incision with Metzenbaum

## 2024-02-08 NOTE — PROGRESS NOTES
Post-Partum  Delivery Note    Riri Whitlock  [id number]  1990  33 y.o.    S:  Ms. Riri Whitlock is a 33 y.o.  POD #1 s/p rLTCS @ 38w1d for Aodil/c/b PPH of 2200 due to uterine artery laceration.   She had a baby girl.  Her lochia is like a period.  She denies SOB, CP, LE swelling. Denies incision drainage or redness. She describes her pain as mild and is well controlled with PO medications.  She is breast feeding and supplementing with formula feeds.  She is ambulating. Jefferson catheter still in place.  Tolerating PO intake.  Endorses some weakness, no dizziness but has not been ambulating.     O:   /70   Pulse 73   Temp 98.1 °F (36.7 °C) (Oral)   Resp 16   Ht 1.778 m (5' 10\")   Wt 81.6 kg (180 lb)   SpO2 97%   Breastfeeding Unknown   BMI 25.83 kg/m²     Gen - No acute distress  Respirations - nonlabored   Abdomen - Fundus firm below the umbilicus   Ext - Warm, well perfused, nontender     Lab Results   Component Value Date/Time    WBC 15.1 2024 06:23 AM    HGB 7.5 2024 06:23 AM    HCT 22.6 2024 06:23 AM     2024 06:23 AM    MCV 85.9 2024 06:23 AM     I/O last 3 completed shifts:  In: 2756.3 [I.V.:1516.2; IV Piggyback:1240.1]  Out: 3110 [Urine:890; Blood:2220]  No intake/output data recorded.  ~56cc/hr (>0.5cc/kg/hr)     A/P:  33 y.o.  POSTOP D #1 s/p rLTCS for Aodil c/b PPH 2200cc, plan for 1u pRBC for symptomatic anemia/low Hg today.  Currently hemodynamically stable.   1.  Routine PP instructions/ care discussed  2.  Blood type - Rh pos  3. Postop Care      >Post-op concerns: PPH 2200cc, continue with jefferson in place to monitor UOP, discontinue when adequate, 1u pRBC this morning with low threshold if symptomatic when ambulating to give second       >Reviewed wound care instructions and warning signs POD#1      >Incision site healing well, no signs of infection       >Encouraged ambulation when jefferson discontinued      >SCDs while in bed,  moderate risk factors for VTE   4.  Rubella immune  5.  Circumcision N/A   6.  Discharge POD 2-3   7.  F/U 4-6 weeks for PP check.      Blood consent:   The patient was consented for use of blood products (RBCs, platelets, FFP, cryo) in the case of life-threatening hemorrhage and she will  accept use of all products. The risks of acute hemolytic transfusion reaction, fever, TRALI, TACO, and transfusion-associated infection is rare (approximately 1/1,000,000 for HIV, and 1/1-2,000,000 for Hepatis B/C). The patient verbalized her consent, and wished to proceeds. All consent forms were witnessed, signed, and placed in the patient's designated chart.      Marry Lorenzo MD  [facility]

## 2024-02-08 NOTE — BRIEF OP NOTE
OP Note  2024      PreOp Diagnosis: Failed  attempt, Arrest dilation 5 cms, Bloody AF ? Abruption, 38 weeks +GBS    Post OP diagnosis: Same + LFI    Anesthesia: Epidural    Surgeon: KARLA Herron    Assistant: * No surgical staff found *    Procedure: Left paravesical extraperitoneal , Modified Maylard transverse low abdominal incision    Birth:  Baby Weight:   Information for the patient's :  Quique Whitlock Riri [[id number]]   Birth Weight: 4.14 kg (9 lb 2 oz)     Baby Apgar 1 min:   Information for the patient's :  Quique Whitlock Riri [[id number]]   APGAR One: N/A     Baby Apgar 5 min:   Information for the patient's :  WhitlockQuique [[id number]]   APGAR Five: N/A     Baby :   Information for the patient's :  Quique Whitlock Riri [[id number]]   2024     Baby Type of Delivery:   Information for the patient's :  Quique Whitlock [[id number]]   Delivery Method: N/A     Baby Gender:   Information for the patient's :  Quique Whitlock Riri [[id number]]   female     Specimen: Placenta    Drain:  Lowery    QBL: 2200 mls, from laceration left uterine artery    Complications: None    Implants: None    Dictation: [id number]    Rodney Herron MD  2024

## 2024-02-08 NOTE — ANESTHESIA POSTPROCEDURE EVALUATION
Department of Anesthesiology  Postprocedure Note    Patient: Riri Whitlock  MRN: [id number]  YOB: 1990  Date of evaluation: 2/8/2024    Procedure Summary     Date: 02/07/24 Room / Location:     Anesthesia Start: 1215 Anesthesia Stop: 2251    Procedure: Labor Analgesia Diagnosis:     Scheduled Providers:  Responsible Provider: Luc Mistry MD    Anesthesia Type: Regional, Epidural ASA Status: 2          Anesthesia Type: Regional, Epidural    Too Phase I:      Too Phase II:      Anesthesia Post Evaluation    Patient location during evaluation: bedside  Patient participation: complete - patient participated  Level of consciousness: awake  Airway patency: patent  Nausea & Vomiting: no vomiting and no nausea  Cardiovascular status: hemodynamically stable  Respiratory status: acceptable  Hydration status: stable  Pain management: adequate        No notable events documented.

## 2024-02-08 NOTE — PROGRESS NOTES
0650: Dr. Herron informed of pt's low urine output (approx. 15mL/hr). Bolus of 1000mL of LR ordered to run over two hours.

## 2024-02-08 NOTE — PROGRESS NOTES
Labor Progress Note  Patient seen, fetal heart rate and contraction pattern evaluated, patient examined.  Patient is comfortable with epidural.  Vitals:    24   BP:    Pulse:    Resp: 20   Temp: 99.1 °F (37.3 °C)   SpO2: 100%       Physical Exam:    33 y.o.  in no acute distress.    Cervical Exam:   Presentation Vertex  Dilation 5 cms   Effacement 90 %   Station -2  Membranes: prior amniotomy with bloody fluid, still bloody  Uterine Activity:   Frequency: Every 4 minutes   Duration: 60 seconds   Intensity: moderate  Fetal Heart Rate:    Baseline: 155 bpm   Variability: Moderate   Accelerations: Absent   Decelerations: None  Category: Category 1    Recent Results (from the past 12 hour(s))   CBC    Collection Time: 24 11:31 AM   Result Value Ref Range    WBC 14.1 (H) 3.6 - 11.0 K/uL    RBC 3.96 3.80 - 5.20 M/uL    Hemoglobin 11.5 11.5 - 16.0 g/dL    Hematocrit 34.2 (L) 35.0 - 47.0 %    MCV 86.4 80.0 - 99.0 FL    MCH 29.0 26.0 - 34.0 PG    MCHC 33.6 30.0 - 36.5 g/dL    RDW 12.9 11.5 - 14.5 %    Platelets 313 150 - 400 K/uL    MPV 9.1 8.9 - 12.9 FL    Nucleated RBCs 0.0 0  WBC    nRBC 0.00 0.00 - 0.01 K/uL   TYPE AND SCREEN    Collection Time: 24 11:31 AM   Result Value Ref Range    Crossmatch expiration date 02/10/2024,2359     ABO/Rh A POSITIVE     Antibody Screen NEG        Assessment/Plan:     Prior  with bloody fluid.  Fetal heart rate was tachycardia, but better now.  Cervix has little change  We discussed options, and she would like to proceed with a .  I suspect an abruption.  I recommend a  delivery.  I reviewed the benefits and risks, including infection, incsional problems, injury to adjacent organs, bleeding, and medical complications (pneumonia, thrombosis).  I answered her questions.  She agrees.        Rodney Herron MD  2024

## 2024-02-08 NOTE — PROGRESS NOTES
Care assumed at this time, repositioned pt, adwoa care done, adwoa pad changed     2045 Dr Herron in room checking pt, tracing reviewed, SVE done, cervix 5 cm dilated, bleeding ++ with SVE    2055 section called by Dr Herron    2/8 0145 Dr Herron made aware of pt's PP vitals and I/O, order received to transfer pt to PP

## 2024-02-08 NOTE — LACTATION NOTE
This note was copied from a baby's chart.  Mother attempting to feed baby on right side.  Right nipple noted to have an abrasion, baby sleeping.  Assisted mother with waking baby, positioning on left breast, and latching deeply.  Baby latched however no suck achieved.  Hand expression taught as a means to feed baby when baby is too sleepy to nurse.  BF basics reviewed.    Discussed with mother her plan for feeding.  Reviewed the benefits of exclusive breast milk feeding during the hospital stay.   Informed her of the risks of using formula to supplement in the first few days of life as well as the benefits of successful breast milk feeding; referred her to the Breastfeeding booklet about this information.   She acknowledges understanding of information reviewed and states that it is her plan to breastfeed her infant.  Will support her choice and offer additional information as needed.     Reviewed breastfeeding basics:  How milk is made and normal  breastfeeding behaviors discussed.  Supply and demand,  stomach size, early feeding cues, skin to skin bonding with comfortable positioning and baby led latch-on reviewed.  How to identify signs of successful breastfeeding sessions reviewed; education on asymetrical latch, signs of effective latching vs shallow, in-effective latching, normal  feeding frequency and duration and expected infant output discussed.  Normal course of breastfeeding discussed including the AAP's recommendation that children receive exclusive breast milk feedings for the first six months of life with breast milk feedings to continue through the first year of life and/or beyond as complimentary table foods are added.  Breastfeeding Booklet and Warm line information provided with discussion.  Discussed typical  weight loss and the importance of pediatrician appointment within 24-48 hours of discharge, at 2 weeks of life and normalcy of requesting pediatric weight checks

## 2024-02-09 VITALS
RESPIRATION RATE: 16 BRPM | HEIGHT: 70 IN | TEMPERATURE: 97.9 F | WEIGHT: 180 LBS | DIASTOLIC BLOOD PRESSURE: 70 MMHG | SYSTOLIC BLOOD PRESSURE: 108 MMHG | OXYGEN SATURATION: 99 % | BODY MASS INDEX: 25.77 KG/M2 | HEART RATE: 77 BPM

## 2024-02-09 LAB
ABO + RH BLD: NORMAL
BASOPHILS # BLD: 0 K/UL (ref 0–0.1)
BASOPHILS # BLD: 0 K/UL (ref 0–0.1)
BASOPHILS NFR BLD: 0 % (ref 0–1)
BASOPHILS NFR BLD: 0 % (ref 0–1)
BLD PROD TYP BPU: NORMAL
BLOOD BANK DISPENSE STATUS: NORMAL
BLOOD GROUP ANTIBODIES SERPL: NORMAL
BPU ID: NORMAL
CROSSMATCH RESULT: NORMAL
DIFFERENTIAL METHOD BLD: ABNORMAL
DIFFERENTIAL METHOD BLD: ABNORMAL
EOSINOPHIL # BLD: 0 K/UL (ref 0–0.4)
EOSINOPHIL # BLD: 0.1 K/UL (ref 0–0.4)
EOSINOPHIL NFR BLD: 0 % (ref 0–7)
EOSINOPHIL NFR BLD: 1 % (ref 0–7)
ERYTHROCYTE [DISTWIDTH] IN BLOOD BY AUTOMATED COUNT: 13.5 % (ref 11.5–14.5)
ERYTHROCYTE [DISTWIDTH] IN BLOOD BY AUTOMATED COUNT: 13.6 % (ref 11.5–14.5)
HCT VFR BLD AUTO: 22.9 % (ref 35–47)
HCT VFR BLD AUTO: 25.8 % (ref 35–47)
HGB BLD-MCNC: 7.6 G/DL (ref 11.5–16)
HGB BLD-MCNC: 8.5 G/DL (ref 11.5–16)
IMM GRANULOCYTES # BLD AUTO: 0 K/UL
IMM GRANULOCYTES # BLD AUTO: 0.3 K/UL (ref 0–0.04)
IMM GRANULOCYTES NFR BLD AUTO: 0 %
IMM GRANULOCYTES NFR BLD AUTO: 2 % (ref 0–0.5)
LYMPHOCYTES # BLD: 2.8 K/UL (ref 0.8–3.5)
LYMPHOCYTES # BLD: 3.3 K/UL (ref 0.8–3.5)
LYMPHOCYTES NFR BLD: 22 % (ref 12–49)
LYMPHOCYTES NFR BLD: 30 % (ref 12–49)
MCH RBC QN AUTO: 29.2 PG (ref 26–34)
MCH RBC QN AUTO: 29.2 PG (ref 26–34)
MCHC RBC AUTO-ENTMCNC: 32.9 G/DL (ref 30–36.5)
MCHC RBC AUTO-ENTMCNC: 33.2 G/DL (ref 30–36.5)
MCV RBC AUTO: 88.1 FL (ref 80–99)
MCV RBC AUTO: 88.7 FL (ref 80–99)
METAMYELOCYTES NFR BLD MANUAL: 1 %
MONOCYTES # BLD: 0.1 K/UL (ref 0–1)
MONOCYTES # BLD: 0.5 K/UL (ref 0–1)
MONOCYTES NFR BLD: 1 % (ref 5–13)
MONOCYTES NFR BLD: 4 % (ref 5–13)
NEUTS BAND NFR BLD MANUAL: 8 % (ref 0–6)
NEUTS SEG # BLD: 7.5 K/UL (ref 1.8–8)
NEUTS SEG # BLD: 8.9 K/UL (ref 1.8–8)
NEUTS SEG NFR BLD: 60 % (ref 32–75)
NEUTS SEG NFR BLD: 71 % (ref 32–75)
NRBC # BLD: 0 K/UL (ref 0–0.01)
NRBC # BLD: 0.02 K/UL (ref 0–0.01)
NRBC BLD-RTO: 0 PER 100 WBC
NRBC BLD-RTO: 0.2 PER 100 WBC
PLATELET # BLD AUTO: 238 K/UL (ref 150–400)
PLATELET # BLD AUTO: 284 K/UL (ref 150–400)
PMV BLD AUTO: 9 FL (ref 8.9–12.9)
PMV BLD AUTO: 9 FL (ref 8.9–12.9)
RBC # BLD AUTO: 2.6 M/UL (ref 3.8–5.2)
RBC # BLD AUTO: 2.91 M/UL (ref 3.8–5.2)
RBC MORPH BLD: ABNORMAL
RBC MORPH BLD: ABNORMAL
SPECIMEN EXP DATE BLD: NORMAL
UNIT DIVISION: 0
WBC # BLD AUTO: 11 K/UL (ref 3.6–11)
WBC # BLD AUTO: 12.6 K/UL (ref 3.6–11)
WBC MORPH BLD: ABNORMAL
WBC MORPH BLD: ABNORMAL

## 2024-02-09 PROCEDURE — 36415 COLL VENOUS BLD VENIPUNCTURE: CPT

## 2024-02-09 PROCEDURE — 6370000000 HC RX 637 (ALT 250 FOR IP): Performed by: STUDENT IN AN ORGANIZED HEALTH CARE EDUCATION/TRAINING PROGRAM

## 2024-02-09 PROCEDURE — 6370000000 HC RX 637 (ALT 250 FOR IP): Performed by: OBSTETRICS & GYNECOLOGY

## 2024-02-09 PROCEDURE — 85025 COMPLETE CBC W/AUTO DIFF WBC: CPT

## 2024-02-09 PROCEDURE — 94761 N-INVAS EAR/PLS OXIMETRY MLT: CPT

## 2024-02-09 PROCEDURE — 6360000002 HC RX W HCPCS: Performed by: OBSTETRICS & GYNECOLOGY

## 2024-02-09 RX ORDER — IBUPROFEN 800 MG/1
800 TABLET ORAL EVERY 8 HOURS PRN
Status: DISCONTINUED | OUTPATIENT
Start: 2024-02-09 | End: 2024-02-09 | Stop reason: HOSPADM

## 2024-02-09 RX ORDER — ACETAMINOPHEN 500 MG
1000 TABLET ORAL EVERY 8 HOURS SCHEDULED
Qty: 60 TABLET | Refills: 0 | Status: SHIPPED | OUTPATIENT
Start: 2024-02-09

## 2024-02-09 RX ORDER — IBUPROFEN 800 MG/1
800 TABLET ORAL EVERY 8 HOURS
Qty: 60 TABLET | Refills: 0 | Status: SHIPPED | OUTPATIENT
Start: 2024-02-09

## 2024-02-09 RX ORDER — OXYCODONE HYDROCHLORIDE 5 MG/1
5 TABLET ORAL EVERY 4 HOURS PRN
Qty: 12 TABLET | Refills: 0 | Status: SHIPPED | OUTPATIENT
Start: 2024-02-09 | End: 2024-02-12

## 2024-02-09 RX ADMIN — KETOROLAC TROMETHAMINE 30 MG: 30 INJECTION, SOLUTION INTRAMUSCULAR at 00:12

## 2024-02-09 RX ADMIN — OXYCODONE HYDROCHLORIDE 5 MG: 5 TABLET ORAL at 13:44

## 2024-02-09 RX ADMIN — ACETAMINOPHEN 1000 MG: 500 TABLET ORAL at 08:16

## 2024-02-09 RX ADMIN — OXYCODONE 10 MG: 5 TABLET ORAL at 00:12

## 2024-02-09 RX ADMIN — IBUPROFEN 800 MG: 800 TABLET, FILM COATED ORAL at 10:24

## 2024-02-09 RX ADMIN — OXYCODONE HYDROCHLORIDE 5 MG: 5 TABLET ORAL at 04:44

## 2024-02-09 RX ADMIN — ACETAMINOPHEN 1000 MG: 500 TABLET ORAL at 04:44

## 2024-02-09 ASSESSMENT — PAIN DESCRIPTION - DESCRIPTORS
DESCRIPTORS: ACHING;SORE
DESCRIPTORS: ACHING;NAGGING;DISCOMFORT

## 2024-02-09 ASSESSMENT — PAIN DESCRIPTION - ORIENTATION
ORIENTATION: ANTERIOR;LOWER
ORIENTATION: ANTERIOR;LOWER

## 2024-02-09 ASSESSMENT — PAIN SCALES - GENERAL
PAINLEVEL_OUTOF10: 7
PAINLEVEL_OUTOF10: 5

## 2024-02-09 ASSESSMENT — PAIN DESCRIPTION - LOCATION
LOCATION: ABDOMEN
LOCATION: ABDOMEN

## 2024-02-09 NOTE — DISCHARGE INSTRUCTIONS
Discharge Instructions for  Section    Patient ID:  Riri Whitlock  [id number]  33 y.o.  1990    Continue taking your prenatal vitamins if you are breastfeeding.    Follow-up care is a key part of your treatment and safety. Be sure to keep all your scheduled appointments    Activity  Avoid heavy lifting greater than 10lbs for 2 weeks after your surgery  Pelvic rest for 6 weeks, ie, nothing in your vagina for 6 weeks  (no intercourse, tampons, or douching). No tubs for 6 weeks.  No driving for 2 weeks and until you are no longer taking prescription pain medications and you can put your foot on the break in a hurry    Limit climbing stairs to only when necessary the first 1-2 weeks.  Hold the railing and do not carry your baby up and downstairs at first for safety   You may walk as tolerated, though be careful not to over-do it.  Walking will assist in overall healing, decrease constipation and bloating. You'll feel better more quickly with some daily movement.    Diet  Regular diet as tolerated    Wound care  There are several types of incision closures.  If you have steri strips covering your incision, you may remove them as they fall off. Be sure to remove them one week after your surgery if some still remain.  Removing them in the shower may be easier.  If you have Dermabond, or skin glue, covering your incision, it will fall off slowly in the next few weeks.  You may remove it as it begins to peel off.     Additional wound care  Clear or reddish drainage from your incision is normal.  It's best to leave it open to the air, but if there is drainage, you may cover the incision lightly with gauze, preferably without tape.    Keep the incision clean and dry.    Numbness of the skin at or around your incision is normal and the feeling usually returns gradually.    Call your doctor if you have increasing drainage from your incision, an unpleasant odor, red streaks, an increase in pain, or if it appears to

## 2024-02-09 NOTE — PROGRESS NOTES
Post-Partum  Delivery Note    Riri Whitlock  [id number]  1990  33 y.o.    S:  Ms. Riri Whitlock is a 33 y.o.  POD #2 s/p rLTCS @ 38w1d for Aodil/c/b PPH of 2200 due to uterine artery laceration.   She had a baby girl.  Her lochia is like a period.  She denies SOB, CP, LE swelling. Denies incision drainage or redness. She describes her pain as mild and is well controlled with PO medications.  She is breast feeding and supplementing with formula feeds.  She is ambulating. Lowery out, she is voiding spontaneously.  Tolerating PO intake.  Denies lightheadedness or dizziness with ambulation  O:   /72   Pulse 60   Temp 98.2 °F (36.8 °C) (Oral)   Resp 16   Ht 1.778 m (5' 10\")   Wt 81.6 kg (180 lb)   SpO2 96%   Breastfeeding Unknown   BMI 25.83 kg/m²     Gen - No acute distress  Respirations - nonlabored   Abdomen - Fundus firm below the umbilicus. Incision c/d/I, covered with Dermabond   Ext - Warm, well perfused, nontender     Lab Results   Component Value Date/Time    WBC 11.0 2024 06:49 AM    HGB 7.6 2024 06:49 AM    HCT 22.9 2024 06:49 AM     2024 06:49 AM    MCV 88.1 2024 06:49 AM     I/O last 3 completed shifts:  In: 1661.3 [I.V.:1000; Blood:411.3; IV Piggyback:250]  Out: 4355 [Urine:2135; Blood:2220]  No intake/output data recorded.  ~56cc/hr (>0.5cc/kg/hr)     A/P:  33 y.o.  POSTOP D #2 s/p rLTCS for Aodil c/b PPH 2200cc, recovering well  1.  Routine PP instructions/ care discussed  2.  Blood type - Rh pos  3. Postop Care      > PPH 2200cc, s/p 1U pRBCs yesterday. Hgb 7.5 --> 1u pRBCs--> 9.6 --> 7.6 (). Drop in Hgb this AM but \nontachycardic, normotensive. Abdominal exam is benign. Will repeat labs at 3PM   4.  Rubella immune  5.  Circumcision N/A   6.  Discharge pending. Strongly desires discharge home today. If labs are stable and she feels well clinically, can consider discharge later this evening.  7.  F/U 4-6 weeks for PP check.

## 2024-02-09 NOTE — PROGRESS NOTES
Pt. Off unit in stable condition via wheelchair with volunteers for discharge home per Dr. Ortega . Pt. Is to follow up in 2 weeks and is aware. Prescriptions given to pt. Pt. Denies any headache, dizziness, nausea, vomiting, or pain at this time. Infant in car seat with mother.

## 2024-02-09 NOTE — DISCHARGE SUMMARY
Obstetrical Discharge Summary     Name: Riri Whitlock MRN: [id number]  SSN: [id number]    YOB: 1990  Age: 33 y.o.  Sex: female      Admit Date: 2024    Discharge Date: 2024     Attending Physician:  Lia Ortega MD     Delivering Physician:  Rodney Herron MD     * Admission Diagnoses:   IUP @ 38w1d  Labor      * Discharge Diagnoses:   Delivery of a viable infant via rLTCS by MD Gopal on 2024.  Apgars were 8 and 9.    Postpartum hemorrhage      Additional Diagnoses:      No results found for: \"RUBELLAEXT\", \"GRBSEXT\"   There is no immunization history for the selected administration types on file for this patient.    * Procedures:   Repeat low transverse  delivery         * Discharge Condition: good    * Hospital Course: Normal hospital course following the delivery. Required transfusion of 1U of pRBCs due to acute blood loss anemia.    * Disposition: Home    Discharge Medications:      Medication List        START taking these medications      acetaminophen 500 MG tablet  Commonly known as: TYLENOL  Take 2 tablets by mouth every 8 hours     ibuprofen 800 MG tablet  Commonly known as: ADVIL;MOTRIN  Take 1 tablet by mouth in the morning and 1 tablet at noon and 1 tablet in the evening.     oxyCODONE 5 MG immediate release tablet  Commonly known as: ROXICODONE  Take 1 tablet by mouth every 4 hours as needed for Pain for up to 3 days. Max Daily Amount: 30 mg            CONTINUE taking these medications      PRENATAL 1 + IRON PO            STOP taking these medications      Aspirin Adult Low Dose 81 MG EC tablet  Generic drug: aspirin     Zofran 4 MG tablet  Generic drug: ondansetron               Where to Get Your Medications        These medications were sent to [id number] [place], [place] - [address] - P [phone] - F [phone] [address] [facility] [address]      Phone: [phone]   acetaminophen 500 MG

## 2024-02-09 NOTE — LACTATION NOTE
This note was copied from a baby's chart.  Mother states BF has been going well.  Mother states baby spit up some blood following last feeding.  Nickel sized bloody spit up not in basinet.  Mothers nipples intact bilaterally.  Asked mother to express milk from the right side, side that baby last nursed from.  Blood droplet noted with hand expression.  No damage to nipple noted. \"Leaky pipe syndrome\" reviewed with mother.  Encouraged mother to nurse from left breast for a couple feeds and let the right breast rest.  Instructed mother to consult with her OB if blood continues, or increases after the next few days.  Mother verbalizes understanding.    Chart shows numerous feedings, void, stool WNL.  Discussed importance of monitoring outputs and feedings on first week of life.  Discussed ways to tell if baby is  getting enough breast milk, ie  voids and stools, change in color of stool, and return to birth wt within 2 weeks.  Follow up with pediatrician visit for weight check in 1-2 days (per AAP guidelines.)  Encouraged to call Warm Line  [phone]  for any questions/problems that arise. Mother also given breastfeeding support group dates and times for any future needs    Engorgement Care Guidelines:  Reviewed how milk is made and normal phases of milk production.  Taught care of engorged breasts - physiologic breastfeeding encouraged with use of cool packs (no ice directly on skin). Consider use of NSAIDS where appropriate for discomfort and inflammation. Can employ light touch, lymphatic drainage techniques on tender grandular tissues. Anticipatory guidance shared.    Pt will successfully establish breastfeeding by feeding in response to early feeding cues   or wake every 3h, will obtain deep latch, and will keep log of feedings/output.  Taught to BF at hunger cues and or q 2-3 hrs and to offer 10-20 drops of hand expressed colostrum at any non-feeds.      Left Breast: Soft (intact)  Left Nipple: Protrude  Right

## 2024-12-11 ENCOUNTER — TRANSCRIBE ORDERS (OUTPATIENT)
Facility: HOSPITAL | Age: 34
End: 2024-12-11

## 2024-12-11 DIAGNOSIS — N88.4: Primary | ICD-10-CM

## 2024-12-11 DIAGNOSIS — Z01.411 ABNORMAL FEMALE PELVIC EXAM: ICD-10-CM

## 2024-12-31 ENCOUNTER — HOSPITAL ENCOUNTER (OUTPATIENT)
Facility: HOSPITAL | Age: 34
Discharge: HOME | End: 2025-01-03
Attending: STUDENT IN AN ORGANIZED HEALTH CARE EDUCATION/TRAINING PROGRAM
Payer: COMMERCIAL

## 2024-12-31 DIAGNOSIS — Z01.411 ABNORMAL FEMALE PELVIC EXAM: ICD-10-CM

## 2024-12-31 DIAGNOSIS — N88.4: ICD-10-CM

## 2024-12-31 PROCEDURE — 72191 CT ANGIOGRAPH PELV W/O&W/DYE: CPT

## 2024-12-31 PROCEDURE — 6360000004 HC RX CONTRAST MEDICATION: Performed by: STUDENT IN AN ORGANIZED HEALTH CARE EDUCATION/TRAINING PROGRAM

## 2024-12-31 RX ORDER — IOPAMIDOL 755 MG/ML
100 INJECTION, SOLUTION INTRAVASCULAR
Status: COMPLETED | OUTPATIENT
Start: 2024-12-31 | End: 2024-12-31

## 2024-12-31 RX ADMIN — IOPAMIDOL 100 ML: 755 INJECTION, SOLUTION INTRAVENOUS at 11:58

## (undated) DEVICE — SUTURE MCRYL SZ 4 0 L18IN ABSRB VLT PS 1 L24MM 3 8 CIR REV Y682H

## (undated) DEVICE — C-SECTION-SFMC: Brand: MEDLINE INDUSTRIES, INC.

## (undated) DEVICE — STRAP,POSITIONING,KNEE/BODY,FOAM,4X60": Brand: MEDLINE

## (undated) DEVICE — ADHESIVE SKIN CLSR 0.7ML TOP DERMBND ADV

## (undated) DEVICE — APPLICATOR MEDICATED 26 CC SOLUTION HI LT ORNG CHLORAPREP

## (undated) DEVICE — TOWEL,OR,DSP,ST,BLUE,STD,2/PK,40PK/CS: Brand: MEDLINE

## (undated) DEVICE — CANISTER, RIGID, 3000CC: Brand: MEDLINE INDUSTRIES, INC.

## (undated) DEVICE — GOWN,SIRUS,FABRNF,XL,20/CS: Brand: MEDLINE

## (undated) DEVICE — SUTURE VCRL + SZ 2-0 L36IN ABSRB UD L36MM CT-1 1/2 CIR VCP945H

## (undated) DEVICE — CLEANER ES TIP W2XL2IN ADH BK RADPQ FOR S STL ELECTRD

## (undated) DEVICE — SOLIDIFIER FLD 3.2OZ 3000CC TRAD IN BTL LIQUI-LOC

## (undated) DEVICE — ELECTRODE PT RET AD L9FT HI MOIST COND ADH HYDRGEL CORDED

## (undated) DEVICE — TOTAL TRAY, 16FR 10ML SIL FOLEY, URN: Brand: MEDLINE

## (undated) DEVICE — SUTURE ABSORBABLE MONOFILAMENT 0 CTX 60 IN VIO PDS + PDP990G

## (undated) DEVICE — INTENT OT USE PROVIDES A STERILE INTERFACE BETWEEN THE OPERATING ROOM SURGICAL LAMPS (NON-STERILE) AND THE SURGEON OR STAFF WORKING IN THE STERILE FIELD.: Brand: ASPEN® ALC PLUS LIGHT HANDLE COVER

## (undated) DEVICE — PENCIL ES L3M ROCK SWCH S STL HEX LOK BLDE ELECTRD HOLSTER

## (undated) DEVICE — SYRINGE IRRIG 60ML SFT PLIABLE BLB EZ TO GRP 1 HND USE W/

## (undated) DEVICE — SOLUTION IRRIG 1000ML 0.9% SOD CHL USP POUR PLAS BTL

## (undated) DEVICE — GLOVE ORANGE PI 8   MSG9080

## (undated) DEVICE — SUTURE VCRL + SZ 0 L36IN ABSRB VLT L40MM CT 1/2 CIR TAPR VCP358H